# Patient Record
Sex: FEMALE | NOT HISPANIC OR LATINO | Employment: FULL TIME | URBAN - METROPOLITAN AREA
[De-identification: names, ages, dates, MRNs, and addresses within clinical notes are randomized per-mention and may not be internally consistent; named-entity substitution may affect disease eponyms.]

---

## 2024-08-01 ENCOUNTER — OFFICE VISIT (OUTPATIENT)
Dept: PRIMARY CARE | Facility: CLINIC | Age: 39
End: 2024-08-01
Payer: COMMERCIAL

## 2024-08-01 VITALS
WEIGHT: 121 LBS | DIASTOLIC BLOOD PRESSURE: 80 MMHG | BODY MASS INDEX: 24.39 KG/M2 | OXYGEN SATURATION: 98 % | HEART RATE: 70 BPM | SYSTOLIC BLOOD PRESSURE: 112 MMHG | HEIGHT: 59 IN | TEMPERATURE: 96.7 F

## 2024-08-01 DIAGNOSIS — E55.9 VITAMIN D DEFICIENCY: Primary | ICD-10-CM

## 2024-08-01 DIAGNOSIS — Z23 ENCOUNTER FOR IMMUNIZATION: ICD-10-CM

## 2024-08-01 DIAGNOSIS — Z00.00 ANNUAL PHYSICAL EXAM: ICD-10-CM

## 2024-08-01 DIAGNOSIS — Z71.85 VACCINE COUNSELING: ICD-10-CM

## 2024-08-01 DIAGNOSIS — Z13.6 SCREENING FOR CARDIOVASCULAR CONDITION: ICD-10-CM

## 2024-08-01 DIAGNOSIS — Z71.9: ICD-10-CM

## 2024-08-01 PROCEDURE — 99395 PREV VISIT EST AGE 18-39: CPT | Performed by: STUDENT IN AN ORGANIZED HEALTH CARE EDUCATION/TRAINING PROGRAM

## 2024-08-01 PROCEDURE — 99213 OFFICE O/P EST LOW 20 MIN: CPT | Performed by: STUDENT IN AN ORGANIZED HEALTH CARE EDUCATION/TRAINING PROGRAM

## 2024-08-01 PROCEDURE — 90471 IMMUNIZATION ADMIN: CPT | Performed by: STUDENT IN AN ORGANIZED HEALTH CARE EDUCATION/TRAINING PROGRAM

## 2024-08-01 PROCEDURE — 90651 9VHPV VACCINE 2/3 DOSE IM: CPT | Performed by: STUDENT IN AN ORGANIZED HEALTH CARE EDUCATION/TRAINING PROGRAM

## 2024-08-01 PROCEDURE — 3008F BODY MASS INDEX DOCD: CPT | Performed by: STUDENT IN AN ORGANIZED HEALTH CARE EDUCATION/TRAINING PROGRAM

## 2024-08-01 ASSESSMENT — PAIN SCALES - GENERAL: PAINLEVEL: 0-NO PAIN

## 2024-08-01 ASSESSMENT — PATIENT HEALTH QUESTIONNAIRE - PHQ9
SUM OF ALL RESPONSES TO PHQ9 QUESTIONS 1 AND 2: 0
2. FEELING DOWN, DEPRESSED OR HOPELESS: NOT AT ALL
1. LITTLE INTEREST OR PLEASURE IN DOING THINGS: NOT AT ALL

## 2024-08-01 NOTE — PATIENT INSTRUCTIONS
Thank you for coming to see me today.    2nd HPV vaccine (Guardacil) in [facility] today.    -You will be due for your 3rd and final HPV vaccine in 6 months.    Go to the lab for fasting blood work, we will call you with all results.    You are due for repeat Pap smear, call to schedule appointment with gynecologist.    If you decide to try for another pregnancy, I recommend that you start taking a daily prenatal vitamin.  -If you are unable to conceive in 6 months with regular unprotected intercourse and use of fertility/ovulation tracking, follow-up with me or gynecologist for further evaluation.    Follow up with me yearly for physical exam, as needed for acute concerns.

## 2024-08-01 NOTE — PROGRESS NOTES
"Subjective   Bia ILA Luis is a 38 y.o. female who presents for Annual Exam.    HPI:      This is a 38-year-old female presenting for physical exam.    PREVENTATIVE HEALTH CARE:    Immunizations:  COVID:  DUE  TDaP:  utd  Pneumonia:  not currently indicated    Immunization History   Administered Date(s) Administered    HPV, Quadrivalent 08/22/2008    Influenza, seasonal, injectable, preservative free 10/20/2016    Pfizer Gray Cap SARS-CoV-2 02/05/2022    Pfizer Purple Cap SARS-CoV-2 01/15/2022    Tdap vaccine, age 7 year and older (BOOSTRIX, ADACEL) 10/20/2016       Screenings:  HIV:  negative 10/28/2019 - utd  HCV:  negative 12/1/2021 - utd  Pap:  11/1/2022 wnl, HPV+ - DUE  Mammogram: Not currently indicated  Colonoscopy: Not currently indicated    Contemplating Pregnancy:  Finace is 35, considering having another child.  Concerned about age.    ROS:    Review of systems is essentially negative for all systems except for any identified issues in HPI above.    Objective     /80   Pulse 70   Temp 35.9 °C (96.7 °F)   Ht 1.499 m (4' 11\")   Wt 54.9 kg (121 lb)   SpO2 98%   BMI 24.44 kg/m²      PHYSICAL EXAM    GENERAL  Well-appearing, pleasant and cooperative.  No acute distress.    HEENT  HEAD:   Normocephalic.  Atraumatic.  EYES:  PERRLA.  No scleral icterus or conjunctival injection.  EARS:  Tympanic membranes visualized bilaterally without erythema, fluid, or bulging.  NECK:  No adenopathy.  No palpable thyroid enlargement or nodules.    THROAT:  Moist oropharynx without tonsillar enlargement or exudates.    LUNGS:    Clear to auscultation bilaterally.  No wheezes, rales, rhonchi.    CARDIAC:  Regular rate and rhythm.  Normal S1S2.  No murmurs/rubs/gallops.    ABDOMEN:  Soft, non-tender, non-distended.  No hepatosplenomegaly.  Normoactive bowel sounds.    MUSCULOSKELETAL:  No gross abnormalities.   No joint swelling or erythema,.  No spinal or paraspinal tenderness to palpation.    EXTREMITIES:  No LE " edema or cyanosis.      NEURO           Alert and oriented x3. No focal deficits.    PSYCH:          Affect appropriate.           Assessment/Plan   Problem List Items Addressed This Visit    None  Visit Diagnoses       Vitamin D deficiency    -  Primary    Relevant Orders    Vitamin D 25-Hydroxy,Total (for eval of Vitamin D levels)    Annual physical exam        Relevant Orders    TSH with reflex to Free T4 if abnormal    Lipid Panel    CBC    Comprehensive Metabolic Panel    Screening for cardiovascular condition        Relevant Orders    Lipid Panel    Vaccine counseling        Relevant Orders    HPV 9-valent vaccine (GARDASIL 9) (Completed)    Encounter for immunization        Relevant Orders    HPV 9-valent vaccine (GARDASIL 9) (Completed)    Other counseling or consultation        Counseled regarding pregnancy considerations.  Advised that she is considered AMA and would undergo additional testing with pregnancy but still within reason.             Leticia Murray MD

## 2024-12-18 ENCOUNTER — OFFICE VISIT (OUTPATIENT)
Dept: OBSTETRICS AND GYNECOLOGY | Facility: HOSPITAL | Age: 39
End: 2024-12-18
Payer: COMMERCIAL

## 2024-12-18 VITALS
SYSTOLIC BLOOD PRESSURE: 106 MMHG | WEIGHT: 119 LBS | BODY MASS INDEX: 23.99 KG/M2 | HEIGHT: 59 IN | DIASTOLIC BLOOD PRESSURE: 60 MMHG

## 2024-12-18 DIAGNOSIS — Z01.419 WELL WOMAN EXAM: Primary | ICD-10-CM

## 2024-12-18 DIAGNOSIS — Z11.3 ROUTINE SCREENING FOR STI (SEXUALLY TRANSMITTED INFECTION): ICD-10-CM

## 2024-12-18 DIAGNOSIS — Z12.4 SCREENING FOR CERVICAL CANCER: ICD-10-CM

## 2024-12-18 PROCEDURE — 3008F BODY MASS INDEX DOCD: CPT

## 2024-12-18 PROCEDURE — 87591 N.GONORRHOEAE DNA AMP PROB: CPT

## 2024-12-18 PROCEDURE — 87661 TRICHOMONAS VAGINALIS AMPLIF: CPT

## 2024-12-18 PROCEDURE — 99385 PREV VISIT NEW AGE 18-39: CPT

## 2024-12-18 PROCEDURE — 1036F TOBACCO NON-USER: CPT

## 2024-12-18 ASSESSMENT — PAIN SCALES - GENERAL: PAINLEVEL_OUTOF10: 0-NO PAIN

## 2024-12-18 NOTE — PROGRESS NOTES
"Assessment/Plan   Problem List Items Addressed This Visit             ICD-10-CM    Well woman exam - Primary Z01.419    Routine screening for STI (sexually transmitted infection) Z11.3    Relevant Orders    HIV 1/2 Antigen/Antibody Screen with Reflex to Confirmation    Syphilis Screen with Reflex    Hepatitis B Surface Antigen    Hepatitis C Antibody    Screening for cervical cancer Z12.4    Relevant Orders    THINPREP PAP TEST (>30)       Monik Gallardo, APRN-CNM     [facility] ILA Luis is a 39 y.o. female who is here for a routine exam. Periods are regular every 28-30 days, lasting  3-5  days. Dysmenorrhea:none. Cyclic symptoms include none. No intermenstrual bleeding, spotting, or discharge.    ROS      /60   Ht 1.499 m (4' 11\")   Wt 54 kg (119 lb)   LMP 11/25/2024 (Exact Date)   BMI 24.04 kg/m²     General:   Alert and oriented x 3   Heart:  Thyroid: Regular rate, rhythm  Euthyroid, normal shape and size   Lungs:  Breast: Clear to auscultation bilaterally  Symmetrical, no skin changes/nipple discharge, redness, tenderness, no masses palpated bilaterally   Abdomen: Soft, non tender   Vulva: EGBUS normal   Vagina: Pink, normal discharge   Cervix: No CMT   Uterus: Normal shape, size   Adnexa: NT bilaterally       Thank you for coming to see me for your visit today and most importantly thank you for having a preventative visit.  If lab work was sent, you will see your test result via "[employer]  Any prescriptions will be sent electronically to your pharmacy listed    I look forward to seeing you next year for your health care needs.  Please remember:   Sleep is important - make it a priority for at least 6 hours per night!   Exercise such as walking for 20 minutes per day is beneficial to your physical and mental health   Healthy diets can be expensive -- if you every feel like you are struggling to afford healthy food, please let me know as we have a very good program called [employer] that " is available to you   Decrease alcohol and tobacco.  A goal of less than 7 alcoholic drinks per week is recommended for risk reduction of breast and colon cancer by 38%!    Be well!  Monik

## 2024-12-19 LAB
C TRACH RRNA SPEC QL NAA+PROBE: NEGATIVE
N GONORRHOEA DNA SPEC QL PROBE+SIG AMP: NEGATIVE

## 2024-12-20 ENCOUNTER — TELEPHONE (OUTPATIENT)
Dept: OBSTETRICS AND GYNECOLOGY | Facility: CLINIC | Age: 39
End: 2024-12-20
Payer: COMMERCIAL

## 2024-12-20 ENCOUNTER — TELEPHONE (OUTPATIENT)
Dept: OBSTETRICS AND GYNECOLOGY | Facility: HOSPITAL | Age: 39
End: 2024-12-20
Payer: COMMERCIAL

## 2024-12-20 LAB — T VAGINALIS RRNA SPEC QL NAA+PROBE: POSITIVE

## 2024-12-23 DIAGNOSIS — A59.9 INFECTION DUE TO TRICHOMONAS: Primary | ICD-10-CM

## 2024-12-23 RX ORDER — METRONIDAZOLE 500 MG/1
500 TABLET ORAL 2 TIMES DAILY
Qty: 14 TABLET | Refills: 0 | Status: SHIPPED | OUTPATIENT
Start: 2024-12-23 | End: 2024-12-30

## 2024-12-26 ENCOUNTER — TELEPHONE (OUTPATIENT)
Dept: OBSTETRICS AND GYNECOLOGY | Facility: HOSPITAL | Age: 39
End: 2024-12-26
Payer: COMMERCIAL

## 2024-12-26 DIAGNOSIS — Z00.00 HEALTHCARE MAINTENANCE: ICD-10-CM

## 2024-12-26 NOTE — TELEPHONE ENCOUNTER
Patient returned RN's call  Verified name and   RN informed patient of positive trichomonas infection.   RN educated patient on infection, antibiotic use, and to abstain from sexual intercourse for 1 week after antibiotic course to ensure there is no reinfection  Prescription sent to pharmacy on file  Partner therapy pended to Monik  Patient requesting test of cure.   Order pended.     All questions and concerns were addressed at the time of the call.     Asha MURILLON, RN

## 2024-12-26 NOTE — TELEPHONE ENCOUNTER
----- Message from Monik Gallardo sent at 12/23/2024  8:14 AM EST -----  Positive for trich  Metronidazole sent to the pharmacy on file  Partner(s) should be notified so they have the opportunity for treatment  Abstinence should be utilized during treatment of all partners and for 1 week post to ensure effectiveness of treatment  Test of cure can be performed in 2-3 months if she desires

## 2024-12-26 NOTE — TELEPHONE ENCOUNTER
RN called to discuss test results  No answer at this time  Voicemail left encouraging patient to call the office back    Asha MURILLON, RN

## 2024-12-31 ENCOUNTER — TELEPHONE (OUTPATIENT)
Dept: OBSTETRICS AND GYNECOLOGY | Facility: CLINIC | Age: 39
End: 2024-12-31
Payer: COMMERCIAL

## 2024-12-31 LAB
CYTOLOGY CMNT CVX/VAG CYTO-IMP: NORMAL
HPV HR 12 DNA GENITAL QL NAA+PROBE: POSITIVE
HPV HR GENOTYPES PNL CVX NAA+PROBE: POSITIVE
HPV16 DNA SPEC QL NAA+PROBE: NEGATIVE
HPV18 DNA SPEC QL NAA+PROBE: NEGATIVE
LAB AP HPV GENOTYPE QUESTION: YES
LAB AP HPV HR: NORMAL
LAB AP PAP ADDITIONAL TESTS: NORMAL
LAB AP PREVIOUS ABNORMAL HISTORY: NORMAL
LAB AP TREATMENT HISTORY: NORMAL
LABORATORY COMMENT REPORT: NORMAL
LMP START DATE: NORMAL
PATH REPORT.TOTAL CANCER: NORMAL

## 2024-12-31 NOTE — TELEPHONE ENCOUNTER
RN called pt to follow up on +Trich results. Attempted two times and phone went straight to busy signal.

## 2025-01-02 NOTE — TELEPHONE ENCOUNTER
RN called patient to discuss test results.   No answer at this time.   Voicemail left encouraging patient to call the office back.   RN will follow up     Asha OGNZALEZ, RN

## 2025-01-02 NOTE — TELEPHONE ENCOUNTER
----- Message from Monik Gallardo sent at 12/31/2024  4:14 PM EST -----  PAP results LSIL, HPV+  Previous PAP in 2022 WNL, HPV+  Per ASCCP guidelines colposcopy is recommended  Please call patient to schedule

## 2025-02-21 ENCOUNTER — PROCEDURE VISIT (OUTPATIENT)
Dept: OBSTETRICS AND GYNECOLOGY | Facility: HOSPITAL | Age: 40
End: 2025-02-21
Payer: COMMERCIAL

## 2025-02-21 VITALS — WEIGHT: 119 LBS | DIASTOLIC BLOOD PRESSURE: 74 MMHG | SYSTOLIC BLOOD PRESSURE: 110 MMHG | BODY MASS INDEX: 24.04 KG/M2

## 2025-02-21 DIAGNOSIS — Z98.890 H/O COLPOSCOPY WITH CERVICAL BIOPSY: ICD-10-CM

## 2025-02-21 DIAGNOSIS — N87.0 DYSPLASIA OF CERVIX, LOW GRADE (CIN 1): Primary | ICD-10-CM

## 2025-02-21 LAB — PREGNANCY TEST URINE, POC: NEGATIVE

## 2025-02-21 PROCEDURE — 57454 BX/CURETT OF CERVIX W/SCOPE: CPT | Performed by: OBSTETRICS & GYNECOLOGY

## 2025-02-21 PROCEDURE — 81025 URINE PREGNANCY TEST: CPT | Performed by: OBSTETRICS & GYNECOLOGY

## 2025-02-21 ASSESSMENT — PAIN SCALES - GENERAL: PAINLEVEL_OUTOF10: 0-NO PAIN

## 2025-02-21 NOTE — PROGRESS NOTES
Patient ID: Bia Luis is a 39 y.o. female.    Colposcopy    Date/Time: 2/21/2025 9:55 AM    Performed by: Carol Diamond MD  Authorized by: Carol Diamond MD    Procedure location: cervix    Consent:     Patient questions answered: yes      Consent obtained:  Verbal    Consent given by:  Patient  Indication:     Cervical indication(s): TAVON 1    Pre-procedure:     Prep solution(s): acetic acid    Procedure:     Colposcopy with: cervical biopsy and endocervical curettage      Biopsy taken: yes      # of biopsies:  3    Biopsy location(s): 12   6  3    Colposcopy details:  Aceowhite with fine punctations    Cervix visibility: fully visualized      SCJ visibility: fully visualized      Lesion visualized: fully visualized      Acetowhite lesion(s): cervix      Cervical impression: low grade      Ferric subsulfate solution applied: yes    Post-procedure:     Patient tolerance of procedure:  Patient tolerated the procedure well with no immediate complications    Instructions and paperwork completed: yes

## 2025-02-23 PROBLEM — Z98.890 H/O COLPOSCOPY WITH CERVICAL BIOPSY: Status: ACTIVE | Noted: 2025-02-23

## 2025-02-23 PROBLEM — N87.0 DYSPLASIA OF CERVIX, LOW GRADE (CIN 1): Status: ACTIVE | Noted: 2025-02-23

## 2025-02-28 LAB
LABORATORY COMMENT REPORT: NORMAL
PATH REPORT.FINAL DX SPEC: NORMAL
PATH REPORT.GROSS SPEC: NORMAL
PATH REPORT.RELEVANT HX SPEC: NORMAL
PATH REPORT.TOTAL CANCER: NORMAL

## 2025-03-04 ENCOUNTER — TELEPHONE (OUTPATIENT)
Dept: OBSTETRICS AND GYNECOLOGY | Facility: CLINIC | Age: 40
End: 2025-03-04
Payer: COMMERCIAL

## 2025-03-04 NOTE — TELEPHONE ENCOUNTER
Contacted pt  Name and  verified  Spoke with pt regarding test results and need for repeat pap in 1 year   Appt scheduled for 2025  Pt also sent information about cervical changes and pap  Pt verbalized understanding.  No further questions or concerns at this time.

## 2025-07-05 ENCOUNTER — OFFICE VISIT (OUTPATIENT)
Dept: URGENT CARE | Age: 40
End: 2025-07-05
Payer: COMMERCIAL

## 2025-07-05 VITALS
RESPIRATION RATE: 16 BRPM | TEMPERATURE: 97.3 F | OXYGEN SATURATION: 97 % | SYSTOLIC BLOOD PRESSURE: 114 MMHG | DIASTOLIC BLOOD PRESSURE: 70 MMHG | BODY MASS INDEX: 23.39 KG/M2 | WEIGHT: 116 LBS | HEIGHT: 59 IN | HEART RATE: 67 BPM

## 2025-07-05 DIAGNOSIS — L02.415 ABSCESS OF RIGHT THIGH: Primary | ICD-10-CM

## 2025-07-05 RX ORDER — SULFAMETHOXAZOLE AND TRIMETHOPRIM 800; 160 MG/1; MG/1
1 TABLET ORAL 2 TIMES DAILY
Qty: 20 TABLET | Refills: 0 | Status: SHIPPED | OUTPATIENT
Start: 2025-07-05 | End: 2025-07-15

## 2025-07-05 ASSESSMENT — ENCOUNTER SYMPTOMS
FEVER: 0
DIAPHORESIS: 0
SHORTNESS OF BREATH: 0
COLOR CHANGE: 1
CHILLS: 0

## 2025-07-05 ASSESSMENT — PAIN SCALES - GENERAL: PAINLEVEL_OUTOF10: 0-NO PAIN

## 2025-07-05 NOTE — PATIENT INSTRUCTIONS
alternate ibuprofen, 800mg max (with food), and tylenol, 1000mg max, every 4 hours as needed for discomfort/fever    Apply warm compresses as much as possible.    Finish all antibiotics, bactrim.    Follow up with your primary care if no improvement in 3 - 4 days.

## 2025-07-05 NOTE — PROGRESS NOTES
"Subjective   Patient ID: Bia ILA Lius is a 39 y.o. female. They present today with a chief complaint of .    History of Present Illness  Pain/erythema right inner upper leg noticed last weekend, tried to pop worse last few days.    Denies fever, chills, sweats, chest pain, SOB.     LMP, last week.          Illness  Associated symptoms: no chest pain, no fever and no shortness of breath        Past Medical History  Allergies as of 07/05/2025    (No Known Allergies)       Prescriptions Prior to Admission[1]     Medical History[2]    Surgical History[3]     reports that she has never smoked. She has never used smokeless tobacco.    Review of Systems  Review of Systems   Constitutional:  Negative for chills, diaphoresis and fever.   Respiratory:  Negative for shortness of breath.    Cardiovascular:  Negative for chest pain.   Skin:  Positive for color change.   All other systems reviewed and are negative.                                 Objective    Vitals:    07/05/25 1414   BP: 114/70   Pulse: 67   Resp: 16   Temp: 36.3 °C (97.3 °F)   SpO2: 97%   Weight: 52.6 kg (116 lb)   Height: (!) 1.499 m (4' 11\")     No LMP recorded.    Physical Exam  Vitals and nursing note reviewed.   Constitutional:       General: She is not in acute distress.  Cardiovascular:      Rate and Rhythm: Normal rate.   Pulmonary:      Effort: Pulmonary effort is normal.   Skin:     Findings: Erythema present.      Comments: Fluctuant abscess, 2-1/2 - 3 cm sarah, right mid inner thigh.    Neurological:      Mental Status: She is alert.         Incision and Drainage    Date/Time: 7/5/2025 3:48 PM    Performed by: Lexie Hernandez PA-C  Authorized by: Lexie Hernandez PA-C    Consent:     Consent obtained:  Verbal    Consent given by:  Patient    Risks, benefits, and alternatives were discussed: yes      Risks discussed:  Bleeding, infection, incomplete drainage and pain    Alternatives discussed:  No treatment, delayed treatment and referral  Universal " protocol:     Procedure explained and questions answered to patient or proxy's satisfaction: yes      Immediately prior to procedure, a time out was called: yes      Patient identity confirmed:  Verbally with patient  Location:     Type:  Abscess    Location:  Lower extremity    Lower extremity location:  Leg    Leg location:  R upper leg  Pre-procedure details:     Skin preparation:  Povidone-iodine  Sedation:     Sedation type:  None  Anesthesia:     Anesthesia method:  Local infiltration    Local anesthetic:  Lidocaine 2% WITH epi  Procedure type:     Complexity:  Complex (3 smaller pockets within the 3cm abscess)  Procedure details:     Incision types:  Stab incision    Incision depth:  Dermal    Drainage:  Purulent and bloody    Drainage amount:  Moderate    Wound treatment:  Wound left open    Packing materials:  None  Post-procedure details:     Procedure completion:  Tolerated      Point of Care Test & Imaging Results from this visit  No results found for this visit on 07/05/25.   Imaging  No results found.    Cardiology, Vascular, and Other Imaging  No other imaging results found for the past 2 days      Diagnostic study results (if any) were reviewed by Lexie Hernandez PA-C.    Assessment/Plan   Allergies, medications, history, and pertinent labs/EKGs/Imaging reviewed by Lexie Hernandez PA-C.         Orders and Diagnoses  There are no diagnoses linked to this encounter.    Medical Admin Record      Patient disposition: Home    Electronically signed by Lexie Hernandez PA-C  2:17 PM             [1] (Not in a hospital admission)  [2]   Past Medical History:  Diagnosis Date    Anemia complicating pregnancy, unspecified trimester 10/20/2016    Anemia in pregnancy    Atypical squamous cells of undetermined significance on cytologic smear of cervix (ASC-US) 07/21/2013    Pap smear abnormality of cervix with ASCUS favoring benign    Encounter for gynecological examination (general) (routine) without abnormal findings  2019    Well female exam with routine gynecological exam    Encounter for prophylactic Rho(d) immune globulin 2016    Need for rhogam due to Rh negative mother    Encounter for screening for malignant neoplasm of cervix 2022    Cervical cancer screening    Encounter for supervision of other normal pregnancy, unspecified trimester ([facility]) 2016    Encounter for supervision of other normal pregnancy, unspecified trimester    Encounter for surveillance of injectable contraceptive 2017    Encounter for Depo-Provera contraception    Other chlamydial infection of lower genitourinary tract     Chlamydia trachomatis infection of lower genitourinary site    Supervision of high risk pregnancy, unspecified, first trimester ([facility]) 2016    High-risk pregnancy in first trimester    Supervision of high risk pregnancy, unspecified, second trimester ([facility]) 2016    High-risk pregnancy in second trimester    Supervision of pregnancy with grand multiparity, third trimester ([facility]) 2016    High risk multigravida, third trimester    Unspecified pre-eclampsia, unspecified trimester ([facility])     Preeclampsia   [3]   Past Surgical History:  Procedure Laterality Date     SECTION, CLASSIC  2018     Section